# Patient Record
Sex: FEMALE | Race: WHITE
[De-identification: names, ages, dates, MRNs, and addresses within clinical notes are randomized per-mention and may not be internally consistent; named-entity substitution may affect disease eponyms.]

---

## 2017-10-10 ENCOUNTER — HOSPITAL ENCOUNTER (OUTPATIENT)
Dept: HOSPITAL 75 - RAD | Age: 27
End: 2017-10-10
Attending: NURSE PRACTITIONER
Payer: COMMERCIAL

## 2017-10-10 DIAGNOSIS — R51: Primary | ICD-10-CM

## 2017-10-10 PROCEDURE — 70450 CT HEAD/BRAIN W/O DYE: CPT

## 2017-10-10 NOTE — DIAGNOSTIC IMAGING REPORT
PROCEDURE: CT head without contrast.



TECHNIQUE: Multiple contiguous axial images were obtained through

the brain without the use of intravenous contrast.



INDICATION:  Headache after injury.



FINDINGS: There is no intracranial hemorrhage, edema or mass

effect. The brain parenchyma and gray-white matter

differentiation is preserved. No hydrocephalus. No extra-axial

fluid collection is seen.



The calvarium, the paranasal sinuses, and orbits visualized

portions appear unremarkable.



IMPRESSION: Unremarkable exam.



Dictated by: 



  Dictated on workstation # [id number]

## 2021-09-18 ENCOUNTER — HOSPITAL ENCOUNTER (EMERGENCY)
Dept: HOSPITAL 75 - ER | Age: 31
Discharge: HOME | End: 2021-09-18
Payer: COMMERCIAL

## 2021-09-18 VITALS — SYSTOLIC BLOOD PRESSURE: 123 MMHG | DIASTOLIC BLOOD PRESSURE: 93 MMHG

## 2021-09-18 VITALS — BODY MASS INDEX: 20.7 KG/M2 | WEIGHT: 121.25 LBS | HEIGHT: 63.98 IN

## 2021-09-18 DIAGNOSIS — K04.7: ICD-10-CM
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
## 2021-09-18 DIAGNOSIS — S02.5XXA: Primary | ICD-10-CM

## 2021-09-18 PROCEDURE — 99283 EMERGENCY DEPT VISIT LOW MDM: CPT

## 2021-09-18 NOTE — ED EENT
History of Present Illness


General


Chief Complaint:  Dental Problems/Pain


Stated Complaint:  DENTAL PAIN


Source:  patient





History of Present Illness


Date Seen by Provider:  Sep 18, 2021


Time Seen by Provider:  04:05


Initial Comments


PT ARRIVES VIA POV FROM HOME


C/O DENTAL PAIN TO LEFT LOWER MOLAR AREA


STATES SHE HAS HAD A FILLING IN THAT TOOTH FOR A LONG TIME


STATES ON WEDNESDAY 09/15/21, SHE WAS EATING AN APPLE AND THE TOOTH BROKE OFF, 

ALONG WITH THE FILLING. 


HAS HAD INCREASING PAIN SINCE THEN, AND TONIGHT, SHE BEGAN HAVING SWELLING TO 

HER LEFT JAW AREA


NO FEVER AT ANY TIME


NO HISTORY OF SIMILAR


HAS TAKEN TYLENOL WITHOUT RELIEF


HAS NOT ATTEMPTED TO FOLLOW UP WITH A DENTIST FOR THIS PROBLEM





PCP: DR. MUELLER


DENTIST: [facility]





Allergies and Home Medications


Allergies


Coded Allergies:  


     No Known Drug Allergies (Unverified , 7/14/13)





Patient Home Medication List


Home Medication List Reviewed:  Yes


Acetaminophen/Codeine (Tylenol W/Codeine #3 Tablet) 1 Tab Tablet, 1-2 TAB PO Q4H

PRN for MODERATE TO SEVERE PAIN


   Prescribed by: SKIP THOMPSON on 5/1/14 0719


Amoxicillin (Amoxicillin) 500 Mg Capsule, 1,000 MG PO BID


   Prescribed by: FANY ROWLEY on 6/12/16 2230


Amoxicillin/Potassium Clav (Augmentin 875-125 Tablet) 1 Each Tablet, 1 EACH PO 

BID


   Prescribed by: NICKY CHAIREZ on 9/18/21 0414


Cephalexin Monohydrate (Cephalexin) 500 Mg Capsule, 1 EACH PO TID


   Prescribed by: MILE DUMONT on 5/9/14 1515


Docusate Sodium (Colace) 100 Mg Cap, 100 MG PO BID


   Prescribed by: SKIP THOMPSON on 5/1/14 0719


Ferrous Sulfate (Feosol Tab) 325 Mg Tab, 325 MG PO DAILY@0700


   Prescribed by: SKIP THOMPSON on 5/1/14 0719


Ibuprofen (Motrin  Tablet) 600 Mg Tab, 600 MG PO Q6H


   Prescribed by: SKIP THOMPSON on 5/1/14 0719


Lidocaine HCl (Lidocaine HCl Viscous) 15 Ml Solution, 1-2 ML MM [id number]


   Prescribed by: NICKY CHAIREZ on 9/18/21 0414


Naproxen (Naproxen) 500 Mg Tablet.dr, 500 MG PO BID


   Prescribed by: NICKY CHAIREZ on 9/18/21 0414


Prenatal Vit/Fe Fumarate/Fa (Prenatal Vitamins Tablet) 1 Each Tablet, 1 EACH PO 

DAILY, (Reported)


   Entered as Reported by: PHYLICIA MACIAS on 3/22/14 0749


Tramadol HCl (Ultram) 50 Mg Tablet, 50 MG PO Q4H


   Prescribed by: NICKY CHAIREZ on 9/18/21 0415





Review of Systems


Review of Systems


Constitutional:  no symptoms reported


Mouth:  see HPI


Throat:  no symptoms reported


Respiratory:  no symptoms reported


Cardiovascular:  no symptoms reported


Musculoskeletal:  no symptoms reported


Skin:  no symptoms reported


Neurological:  No Symptoms Reported





Past Medical-Social-Family Hx


Immunizations Up To Date


Tetanus Booster (TDap):  Unknown


PED Vaccines UTD:  Yes





Seasonal Allergies


Seasonal Allergies:  No





Past Medical History


Surgeries:  No


Respiratory:  No


Cardiac:  No


Neurological:  No


Reproductive Disorders:  No


Female Reproductive Disorders:  Denies


HIV/AIDS:  No


Gastrointestinal:  No


Musculoskeletal:  No


Endocrine:  No


HEENT:  No


Cancer:  No


Psychosocial:  No


Integumentary:  No


Adverse Reaction/Blood Tranf:  No





Family Medical History





Patient reports no known family medical history.


No Pertinent Family Hx





Physical Exam


Vital Signs





Vital Signs - First Documented








 9/18/21





 04:09


 


Temp 37.2


 


Pulse 68


 


Resp 16


 


B/P (MAP) 123/93 (103)


 


Pulse Ox 100


 


O2 Delivery Room Air








Height, Weight, BMI


Height: 5'4.00"


Weight: 124lbs. 0.0oz. 56.382580jt; 21.3 BMI


Method:Stated


General Appearance:  WD/WN, no apparent distress, thin, other (PURPLE AND GRAY 

HAIR)


Mouth/Throat:  dental tenderness; No excessive drooling; mandibular swelling; No

maxillary swelling, No trismus; other (LEFT LOWER FIRST MOLAR BROKEN IN HALF, 

WITH MODERATE SURROUNDING ERYTHEMA AND SWELLING TO ADJACENT GUM TISSUE. NO 

FLUCUTANCE. NO DRAINAGE. MILD SWELLING AND MODERATE TENDERNESS TO LEFT MANDIBLE.

NO ERYTHEMA TO FACE)


Neck:  normal inspection


Cardiovascular:  regular rate, rhythm, no murmur


Respiratory:  normal breath sounds


Neurologic/Psychiatric:  CNs II-XII nml as tested, no motor/sensory deficits, 

alert, normal mood/affect, oriented x 3


Skin:  normal color, warm/dry





Progress/Results/Core Measures


Results/Orders


My Orders





Orders - NICKY CHAIREZ DO


Rx-Naproxen (Rx-Naprosyn) (9/18/21 04:15)


Rx-Tramadol Hcl (Rx-Ultram) (9/18/21 04:15)


Amoxicillin/Clavulanate Tablet (Augmenti (9/18/21 04:15)


Lidocaine 2% Viscous 15 Ml (Xylocaine Vi (9/18/21 04:15)





Vital Signs/I&O











 9/18/21





 04:09


 


Temp 37.2


 


Pulse 68


 


Resp 16


 


B/P (MAP) 123/93 (103)


 


Pulse Ox 100


 


O2 Delivery Room Air











Departure


Impression





   Primary Impression:  


   Dental infection


   Additional Impression:  


   BROKEN TOOTH WITH INFECTION


Disposition:  01 HOME, SELF-CARE


Condition:  Stable





Departure-Patient Inst.


Decision time for Depature:  04:10


Referrals:  


NAHID MUELLER MD (PCP/Family)


Primary Care Physician








[facility]


Patient Instructions:  Fractured Tooth (DC), Tooth Abscess (DC)





Add. Discharge Instructions:  


FREQUENT SALT WATER SWISHES





SOFT FOODS AND AVOID CHEWING ON LEFT SIDE





FOLLOW UP WITH DENTIST AS SOON AS POSSIBLE --CALL IN THE MORNING TO SCHEDULE 

APPOINTMENT





All discharge instructions reviewed with patient and/or family. Voiced 

understanding.


Scripts


Tramadol HCl (Ultram) 50 Mg Tablet


50 MG PO Q4H for Pain, #10 TAB


   Prov: NICKY CHAIREZ DO         9/18/21 


Naproxen (Naproxen) 500 Mg Tablet.dr


500 MG PO BID, #20 TAB


   Prov: SHASTA CHAIREZA K DO         9/18/21 


Lidocaine HCl (Lidocaine HCl Viscous) 15 Ml Solution


1-2 ML MM [id number], #120 ML


   Prov: NICKY CHAIREZ DO         9/18/21 


Amoxicillin/Potassium Clav (Augmentin 875-125 Tablet) 1 Each Tablet


1 EACH PO BID for 10 Days, #20 TAB


   Prov: NICKY CHAIREZ DO         9/18/21











NICKY CHAIREZ DO                 Sep 18, 2021 04:15

## 2021-11-03 ENCOUNTER — HOSPITAL ENCOUNTER (OUTPATIENT)
Dept: HOSPITAL 75 - RAD | Age: 31
End: 2021-11-03
Attending: FAMILY MEDICINE
Payer: COMMERCIAL

## 2021-11-03 DIAGNOSIS — Z32.02: Primary | ICD-10-CM

## 2021-11-03 DIAGNOSIS — N93.9: ICD-10-CM

## 2021-11-03 LAB
ALBUMIN SERPL-MCNC: 4.6 GM/DL (ref 3.2–4.5)
ALP SERPL-CCNC: 41 U/L (ref 40–136)
ALT SERPL-CCNC: 16 U/L (ref 0–55)
BILIRUB SERPL-MCNC: 0.4 MG/DL (ref 0.1–1)
BUN/CREAT SERPL: 10
CALCIUM SERPL-MCNC: 9.4 MG/DL (ref 8.5–10.1)
CHLORIDE SERPL-SCNC: 104 MMOL/L (ref 98–107)
CO2 SERPL-SCNC: 24 MMOL/L (ref 21–32)
CREAT SERPL-MCNC: 0.72 MG/DL (ref 0.6–1.3)
GFR SERPLBLD BASED ON 1.73 SQ M-ARVRAT: 94 ML/MIN
GLUCOSE SERPL-MCNC: 49 MG/DL (ref 70–105)
HCT VFR BLD CALC: 40 % (ref 35–52)
HGB BLD-MCNC: 13.3 G/DL (ref 11.5–16)
MCH RBC QN AUTO: 30 PG (ref 25–34)
MCHC RBC AUTO-ENTMCNC: 34 G/DL (ref 32–36)
MCV RBC AUTO: 89 FL (ref 80–99)
PLATELET # BLD: 266 10^3/UL (ref 130–400)
PMV BLD AUTO: 10.7 FL (ref 9–12.2)
POTASSIUM SERPL-SCNC: 3.6 MMOL/L (ref 3.6–5)
PROT SERPL-MCNC: 7.2 GM/DL (ref 6.4–8.2)
SODIUM SERPL-SCNC: 140 MMOL/L (ref 135–145)
WBC # BLD AUTO: 5.7 10^3/UL (ref 4.3–11)

## 2021-11-03 PROCEDURE — 80053 COMPREHEN METABOLIC PANEL: CPT

## 2021-11-03 PROCEDURE — 85027 COMPLETE CBC AUTOMATED: CPT

## 2021-11-03 PROCEDURE — 84702 CHORIONIC GONADOTROPIN TEST: CPT

## 2021-11-03 PROCEDURE — 84443 ASSAY THYROID STIM HORMONE: CPT

## 2021-11-03 PROCEDURE — 76856 US EXAM PELVIC COMPLETE: CPT

## 2021-11-03 PROCEDURE — 36415 COLL VENOUS BLD VENIPUNCTURE: CPT

## 2021-11-03 NOTE — DIAGNOSTIC IMAGING REPORT
PROCEDURE: US PELVIC (NON-OB).



TECHNIQUE: Multiple real-time grayscale images were obtained over

the pelvis in various projections transabdominally. In addition,

limited pelvic Doppler was performed.



INDICATION: Positive urine pregnancy test.



FINDINGS: Uterus is anteverted measuring 8.2 x 3.4 x 5.0 cm.

Endometrium is 5 mm in thickness. No intrauterine gestational sac

is identified. No myometrial mass is identified. Right ovary

measures 2.3 x 3.3 x 1.7 cm, and the left ovary measures 3.2 x

2.2 x 2.2 cm. Both ovaries contain small follicles. There is

blood flow to both ovaries. No adnexal mass or free fluid is

seen.



IMPRESSION: No evidence of intrauterine or ectopic pregnancy. No

acute feature is detected.



Dictated by: 



  Dictated on workstation # [id number]

## 2023-02-02 ENCOUNTER — HOSPITAL ENCOUNTER (OUTPATIENT)
Dept: HOSPITAL 75 - RAD | Age: 33
End: 2023-02-02
Attending: PHYSICIAN ASSISTANT
Payer: COMMERCIAL

## 2023-02-02 DIAGNOSIS — E04.2: Primary | ICD-10-CM

## 2023-02-02 LAB
ALBUMIN SERPL-MCNC: 4.1 GM/DL (ref 3.2–4.5)
ALP SERPL-CCNC: 44 U/L (ref 40–136)
ALT SERPL-CCNC: 15 U/L (ref 0–55)
BILIRUB SERPL-MCNC: 0.4 MG/DL (ref 0.1–1)
BUN/CREAT SERPL: 13
CALCIUM SERPL-MCNC: 8.9 MG/DL (ref 8.5–10.1)
CHLORIDE SERPL-SCNC: 105 MMOL/L (ref 98–107)
CHOLEST SERPL-MCNC: 199 MG/DL (ref ?–200)
CO2 SERPL-SCNC: 23 MMOL/L (ref 21–32)
CREAT SERPL-MCNC: 0.77 MG/DL (ref 0.6–1.3)
GFR SERPLBLD BASED ON 1.73 SQ M-ARVRAT: 105 ML/MIN
GLUCOSE SERPL-MCNC: 78 MG/DL (ref 70–105)
HCT VFR BLD CALC: 34 % (ref 35–52)
HDLC SERPL-MCNC: 92 MG/DL (ref 40–60)
HGB BLD-MCNC: 11.4 G/DL (ref 11.5–16)
MCH RBC QN AUTO: 30 PG (ref 25–34)
MCHC RBC AUTO-ENTMCNC: 33 G/DL (ref 32–36)
MCV RBC AUTO: 88 FL (ref 80–99)
PLATELET # BLD: 193 10^3/UL (ref 130–400)
PMV BLD AUTO: 11 FL (ref 9–12.2)
POTASSIUM SERPL-SCNC: 3.2 MMOL/L (ref 3.6–5)
PROT SERPL-MCNC: 6.3 GM/DL (ref 6.4–8.2)
SODIUM SERPL-SCNC: 139 MMOL/L (ref 135–145)
TRIGL SERPL-MCNC: 61 MG/DL (ref ?–150)
VLDLC SERPL CALC-MCNC: 12 MG/DL (ref 5–40)
WBC # BLD AUTO: 4.7 10^3/UL (ref 4.3–11)

## 2023-02-02 PROCEDURE — 84443 ASSAY THYROID STIM HORMONE: CPT

## 2023-02-02 PROCEDURE — 85027 COMPLETE CBC AUTOMATED: CPT

## 2023-02-02 PROCEDURE — 36415 COLL VENOUS BLD VENIPUNCTURE: CPT

## 2023-02-02 PROCEDURE — 80053 COMPREHEN METABOLIC PANEL: CPT

## 2023-02-02 PROCEDURE — 80061 LIPID PANEL: CPT

## 2023-02-02 PROCEDURE — 76536 US EXAM OF HEAD AND NECK: CPT

## 2023-02-13 ENCOUNTER — HOSPITAL ENCOUNTER (OUTPATIENT)
Dept: HOSPITAL 75 - RAD | Age: 33
End: 2023-02-13
Attending: PHYSICIAN ASSISTANT
Payer: COMMERCIAL

## 2023-02-13 VITALS — BODY MASS INDEX: 22.24 KG/M2 | HEIGHT: 64.02 IN | WEIGHT: 130.29 LBS

## 2023-02-13 DIAGNOSIS — E04.1: Primary | ICD-10-CM

## 2023-02-13 PROCEDURE — 10005 FNA BX W/US GDN 1ST LES: CPT

## 2023-02-13 NOTE — DIAGNOSTIC IMAGING REPORT
INDICATION: Right thyroid nodule. 



PROCEDURE: The patient presents for ultrasound-guided fine-needle

aspiration and biopsy.



The patient was brought to the procedure room, placed on the

table in the supine position. Ultrasound imaging of the right

neck was performed to evaluate appropriate entry site. The right

neck was then prepped and draped in the usual sterile fashion. A

small amount of 1% lidocaine was utilized for local anesthesia.

Four passes were made into the dominant solid mass right lobe of

the thyroid utilizing 25-gauge needles and fine-needle aspiration

technique. A single pass was made with a Rotex needle and a Rotex

biopsy was performed. Hemostasis was obtained using manual

compression. The patient tolerated the procedure well and left

the department in stable condition.



IMPRESSION: Successful ultrasound-guided fine-needle aspiration

and Rotex biopsy, right lobe thyroid nodule. Pathology results

are currently pending.



Dictated by: 



  Dictated on workstation # [id number]